# Patient Record
Sex: MALE | Race: WHITE
[De-identification: names, ages, dates, MRNs, and addresses within clinical notes are randomized per-mention and may not be internally consistent; named-entity substitution may affect disease eponyms.]

---

## 2017-02-23 ENCOUNTER — HOSPITAL ENCOUNTER (OUTPATIENT)
Age: 62
Discharge: HOME | End: 2017-02-23
Payer: COMMERCIAL

## 2017-02-23 DIAGNOSIS — N41.9: ICD-10-CM

## 2017-02-23 DIAGNOSIS — N40.1: Primary | ICD-10-CM

## 2017-02-23 DIAGNOSIS — R35.1: ICD-10-CM

## 2017-02-23 DIAGNOSIS — R35.0: ICD-10-CM

## 2017-02-23 DIAGNOSIS — R33.9: ICD-10-CM

## 2017-09-12 ENCOUNTER — HOSPITAL ENCOUNTER (OUTPATIENT)
Dept: HOSPITAL 76 - LAB.WCP | Age: 62
Discharge: HOME | End: 2017-09-12
Attending: PHYSICIAN ASSISTANT
Payer: COMMERCIAL

## 2017-09-12 DIAGNOSIS — R31.0: Primary | ICD-10-CM

## 2017-09-12 LAB
ALBUMIN/GLOB SERPL: 1.4 {RATIO} (ref 1–2.2)
ANION GAP SERPL CALCULATED.4IONS-SCNC: 7 MMOL/L (ref 6–13)
BASOPHILS NFR BLD AUTO: 0 10^3/UL (ref 0–0.1)
BASOPHILS NFR BLD AUTO: 0.4 %
BILIRUB BLD-MCNC: 0.7 MG/DL (ref 0.2–1)
BUN SERPL-MCNC: 18 MG/DL (ref 6–20)
CALCIUM UR-MCNC: 9.4 MG/DL (ref 8.5–10.3)
CHLORIDE SERPL-SCNC: 105 MMOL/L (ref 101–111)
CO2 SERPL-SCNC: 26 MMOL/L (ref 21–32)
CREAT SERPLBLD-SCNC: 0.9 MG/DL (ref 0.6–1.2)
EOSINOPHIL # BLD AUTO: 0.2 10^3/UL (ref 0–0.7)
EOSINOPHIL NFR BLD AUTO: 3.1 %
ERYTHROCYTE [DISTWIDTH] IN BLOOD BY AUTOMATED COUNT: 13.4 % (ref 12–15)
GFRSERPLBLD MDRD-ARVRAT: 86 ML/MIN/{1.73_M2} (ref 89–?)
GLOBULIN SER-MCNC: 3.3 G/DL (ref 2.1–4.2)
GLUCOSE SERPL-MCNC: 92 MG/DL (ref 70–100)
HCT VFR BLD AUTO: 41.2 % (ref 42–52)
HGB UR QL STRIP: 14.1 G/DL (ref 14–18)
LYMPHOCYTES # SPEC AUTO: 2 10^3/UL (ref 1.5–3.5)
LYMPHOCYTES NFR BLD AUTO: 26.1 %
MCH RBC QN AUTO: 29.2 PG (ref 27–31)
MCHC RBC AUTO-ENTMCNC: 34.2 G/DL (ref 32–36)
MCV RBC AUTO: 85.3 FL (ref 80–94)
MONOCYTES # BLD AUTO: 0.5 10^3/UL (ref 0–1)
MONOCYTES NFR BLD AUTO: 6.4 %
NEUTROPHILS # BLD AUTO: 4.9 10^3/UL (ref 1.5–6.6)
NEUTROPHILS # SNV AUTO: 7.7 X10^3/UL (ref 4.8–10.8)
NEUTROPHILS NFR BLD AUTO: 64 %
NRBC # BLD AUTO: 0.1 /100WBC
PDW BLD AUTO: 9.6 FL (ref 7.4–11.4)
POTASSIUM SERPL-SCNC: 3.8 MMOL/L (ref 3.5–5)
PROT SPEC-MCNC: 7.8 G/DL (ref 6.7–8.2)
PSA FREE SERPL-MCNC: 0.73 NG/ML (ref 0.16–2.81)
PSA SERPL-MCNC: 7.63 NG/ML (ref 0–2)
RBC MAR: 4.83 10^6/UL (ref 4.7–6.1)
SODIUM SERPLBLD-SCNC: 138 MMOL/L (ref 135–145)
WBC # BLD: 7.7 X10^3/UL

## 2017-09-12 PROCEDURE — 85025 COMPLETE CBC W/AUTO DIFF WBC: CPT

## 2017-09-12 PROCEDURE — 36415 COLL VENOUS BLD VENIPUNCTURE: CPT

## 2017-09-12 PROCEDURE — 80053 COMPREHEN METABOLIC PANEL: CPT

## 2017-09-12 PROCEDURE — 84154 ASSAY OF PSA FREE: CPT

## 2017-09-12 PROCEDURE — 87086 URINE CULTURE/COLONY COUNT: CPT

## 2017-09-18 ENCOUNTER — HOSPITAL ENCOUNTER (OUTPATIENT)
Dept: HOSPITAL 76 - DI | Age: 62
Discharge: HOME | End: 2017-09-18
Attending: PHYSICIAN ASSISTANT
Payer: COMMERCIAL

## 2017-09-18 DIAGNOSIS — R93.41: ICD-10-CM

## 2017-09-18 DIAGNOSIS — R31.0: Primary | ICD-10-CM

## 2017-09-18 PROCEDURE — 74178 CT ABD&PLV WO CNTR FLWD CNTR: CPT

## 2017-09-18 NOTE — CT REPORT
CT IVP: 09/18/2017

 

CLINICAL INDICATION: Gross hematuria. 

 

TECHNIQUE: Axial CT images of the abdomen and pelvis were obtained prior to and following 100 mL of I
sovue-300 intravenously, using split bolus technique. No previous CT is available for comparison. 

 

FINDINGS:  Limited evaluation of the lung bases is unremarkable. 

 

ABDOMEN: On the unenhanced images, there is no evidence of nephrolithiasis or hydronephrosis. The kid
neys demonstrate symmetric uptake and excretion of contrast. Left peripelvic cysts are present. No so
lid parenchymal lesion or collecting system lesion is identified. The liver, spleen, pancreas and adr
enal glands appear unremarkable. A small splenule is noted in the splenic hilum. No bowel dilatation,
 free gas, or free fluid is present. No abdominal adenopathy is seen. 

 

PELVIS: The appendix is seen in the right lower quadrant, and is normal in caliber. Sigmoid diverticu
losis is present, without CT evidence of diverticulitis. There is a possible papillary projection tez
sing from the bladder wall at the dome of the prostate. Correlation with cystoscopy is recommended. T
he distal ureters appear unremarkable. 

 

The osseous structures demonstrate mild degenerative changes. 

 

IMPRESSION:  POSSIBLE PAPILLARY PROJECTION IN THE LUMEN OF THE BLADDER VERSUS ASYMMETRIC ENLARGEMENT 
OF THE PROSTATE PROJECTING INTO THE BLADDER. CORRELATION WITH CYSTOSCOPY IS RECOMMENDED. NO EVIDENCE 
OF UPPER TRACT LESION. 

 

In accordance with CT protocol optimization, one or more of the following dose reduction techniques w
ere utilized for this exam:  automated exposure control, adjustment of mA and/or KV based on patient 
size, or use of iterative reconstructive technique. 

 

 

 

DD:09/18/2017 11:28:01  DT: 09/18/2017 11:42  JOB #: [id number]  EXT JOB #:[id number]

## 2017-12-06 ENCOUNTER — HOSPITAL ENCOUNTER (EMERGENCY)
Dept: HOSPITAL 76 - ED | Age: 62
Discharge: LEFT BEFORE DISCHARGE | End: 2017-12-06
Payer: COMMERCIAL

## 2017-12-06 VITALS — DIASTOLIC BLOOD PRESSURE: 79 MMHG | SYSTOLIC BLOOD PRESSURE: 140 MMHG

## 2017-12-06 DIAGNOSIS — Z53.21: Primary | ICD-10-CM

## 2020-03-08 ENCOUNTER — HOSPITAL ENCOUNTER (OUTPATIENT)
Dept: HOSPITAL 76 - EMS | Age: 65
Discharge: ANOTHER HOSPITAL | End: 2020-03-08
Attending: SURGERY
Payer: MEDICARE

## 2020-03-08 ENCOUNTER — HOSPITAL ENCOUNTER (EMERGENCY)
Dept: HOSPITAL 76 - ED | Age: 65
Discharge: ANOTHER HOSPITAL | End: 2020-03-08
Payer: MEDICARE

## 2020-03-08 VITALS — SYSTOLIC BLOOD PRESSURE: 168 MMHG | DIASTOLIC BLOOD PRESSURE: 105 MMHG

## 2020-03-08 DIAGNOSIS — I21.4: Primary | ICD-10-CM

## 2020-03-08 DIAGNOSIS — I10: ICD-10-CM

## 2020-03-08 DIAGNOSIS — R06.02: ICD-10-CM

## 2020-03-08 DIAGNOSIS — R07.9: Primary | ICD-10-CM

## 2020-03-08 DIAGNOSIS — E11.9: ICD-10-CM

## 2020-03-08 LAB
ALBUMIN DIAFP-MCNC: 4.9 G/DL (ref 3.2–5.5)
ALBUMIN/GLOB SERPL: 1.4 {RATIO} (ref 1–2.2)
ALP SERPL-CCNC: 76 IU/L (ref 42–121)
ALT SERPL W P-5'-P-CCNC: 42 IU/L (ref 10–60)
ANION GAP SERPL CALCULATED.4IONS-SCNC: 12 MMOL/L (ref 6–13)
AST SERPL W P-5'-P-CCNC: 30 IU/L (ref 10–42)
BASOPHILS NFR BLD AUTO: 0 10^3/UL (ref 0–0.1)
BASOPHILS NFR BLD AUTO: 0.4 %
BILIRUB BLD-MCNC: 0.9 MG/DL (ref 0.2–1)
BUN SERPL-MCNC: 25 MG/DL (ref 6–20)
CALCIUM UR-MCNC: 9.4 MG/DL (ref 8.5–10.3)
CHLORIDE SERPL-SCNC: 101 MMOL/L (ref 101–111)
CO2 SERPL-SCNC: 24 MMOL/L (ref 21–32)
CREAT SERPLBLD-SCNC: 1.1 MG/DL (ref 0.6–1.2)
EOSINOPHIL # BLD AUTO: 0.2 10^3/UL (ref 0–0.7)
EOSINOPHIL NFR BLD AUTO: 2.6 %
ERYTHROCYTE [DISTWIDTH] IN BLOOD BY AUTOMATED COUNT: 12.8 % (ref 12–15)
GFRSERPLBLD MDRD-ARVRAT: 67 ML/MIN/{1.73_M2} (ref 89–?)
GLOBULIN SER-MCNC: 3.6 G/DL (ref 2.1–4.2)
GLUCOSE SERPL-MCNC: 146 MG/DL (ref 70–100)
HGB UR QL STRIP: 14.7 G/DL (ref 14–18)
LIPASE SERPL-CCNC: 34 U/L (ref 22–51)
LYMPHOCYTES # SPEC AUTO: 2 10^3/UL (ref 1.5–3.5)
LYMPHOCYTES NFR BLD AUTO: 23.9 %
MCH RBC QN AUTO: 28.4 PG (ref 27–31)
MCHC RBC AUTO-ENTMCNC: 32.9 G/DL (ref 32–36)
MCV RBC AUTO: 86.3 FL (ref 80–94)
MONOCYTES # BLD AUTO: 0.5 10^3/UL (ref 0–1)
MONOCYTES NFR BLD AUTO: 6.1 %
NEUTROPHILS # BLD AUTO: 5.5 10^3/UL (ref 1.5–6.6)
NEUTROPHILS # SNV AUTO: 8.2 X10^3/UL (ref 4.8–10.8)
NEUTROPHILS NFR BLD AUTO: 66.6 %
PDW BLD AUTO: 10 FL (ref 7.4–11.4)
PLATELET # BLD: 200 10^3/UL (ref 130–450)
PROT SPEC-MCNC: 8.5 G/DL (ref 6.7–8.2)
RBC MAR: 5.18 10^6/UL (ref 4.7–6.1)
SODIUM SERPLBLD-SCNC: 137 MMOL/L (ref 135–145)

## 2020-03-08 PROCEDURE — 80053 COMPREHEN METABOLIC PANEL: CPT

## 2020-03-08 PROCEDURE — 84484 ASSAY OF TROPONIN QUANT: CPT

## 2020-03-08 PROCEDURE — 71045 X-RAY EXAM CHEST 1 VIEW: CPT

## 2020-03-08 PROCEDURE — 99285 EMERGENCY DEPT VISIT HI MDM: CPT

## 2020-03-08 PROCEDURE — 36415 COLL VENOUS BLD VENIPUNCTURE: CPT

## 2020-03-08 PROCEDURE — 93005 ELECTROCARDIOGRAM TRACING: CPT

## 2020-03-08 PROCEDURE — 83690 ASSAY OF LIPASE: CPT

## 2020-03-08 PROCEDURE — 85025 COMPLETE CBC W/AUTO DIFF WBC: CPT

## 2020-03-08 NOTE — XRAY REPORT
Reason:  Chest pain

Procedure Date:  03/08/2020   

Accession Number:  [id number] / [id number]                    

Procedure:  XR  - Chest 1 View X-Ray CPT Code:  86702

 

***Final Report***

 

 

FULL RESULT:

 

 

EXAM:

CHEST RADIOGRAPHY

 

EXAM DATE: 3/8/2020 05:42 AM.

 

CLINICAL HISTORY: Chest pain.

 

COMPARISON: CHEST 2 VIEW PA/LAT 04/13/2017 10:12 AM.

 

TECHNIQUE: 1 view.

 

FINDINGS:

Atherosclerotic plaque calcifications are seen in the aorta. The heart 

size is normal. Minimal left basilar atelectasis is seen. The right lung 

is clear. There is no pleural effusion or pneumothorax.

IMPRESSION: Minimal left basilar atelectasis.

 

[facility]IA

## 2020-03-08 NOTE — ED PHYSICIAN DOCUMENTATION
PD HPI CHEST PAIN





- Stated complaint


Stated Complaint: CP/SOA





- Chief complaint


Chief Complaint: Cardiac





- History obtained from


History obtained from: Patient





- History of Present Illness


Timing - onset: How many weeks ago (1)


Timing - onset during: Exertion


Timing - duration: Minutes


Timing - details: Abrupt onset, Intermittant


Pain level max: 5


Pain level now: 0


Quality: Pressure, Dull, Pain


Location: Substernal


Radiation: Left upper extremity, Right upper extremity


Improved by: Rest


Worsened by: Exertion


Associated symptoms: No: Shortness of air, Diaphoresis, Nausea, Vomiting, 

Feeling faint / dizzy, General Weakness, Palpitations, Cough


Similar symptoms before: Has not had sx before


Recently seen: Not recently seen





- Additional information


Additional information: 





c/o one week of exertional chest pain, midline, that radiates down back of both 

arms, mostly LUE. pain is only on exertion, lasts 15-20 minutes and is relieved 

with rest. He has noticed this pain is increasingly frequent with decreasing 

amounts of exertion, and tonight he was having chest pain simply when walking to

the bathroom from his bedroom. 





Review of Systems


Constitutional: reports: Reviewed and negative


Eyes: reports: Reviewed and negative


Ears: reports: Reviewed and negative


Nose: reports: Reviewed and negative


Throat: reports: Reviewed and negative


Cardiac: reports: Chest pain / pressure.  denies: Palpitations, Pedal edema, 

Calf pain


Respiratory: reports: Dyspnea (mild dyspnea during some episodes of chest pain, 

although he says he feels he is just breathing faster as a means of distracting 

himself from the pain).  denies: Cough


GI: reports: Reviewed and negative


: denies: Dysuria, Frequency


Skin: reports: Reviewed and negative


Musculoskeletal: reports: Reviewed and negative


Neurologic: reports: Reviewed and negative





PD PAST MEDICAL HISTORY





- Past Medical History


Cardiovascular: Hypertension


Endocrine/Autoimmune: Type 2 diabetes


GI: Hemorrhoids





- Past Surgical History


Past Surgical History: Yes





- Present Medications


Home Medications: 


                                Ambulatory Orders











 Medication  Instructions  Recorded  Confirmed


 


Lisinopril [Qbrelis]  03/08/20 


 


Tamsulosin [Flomax]  03/08/20 














- Allergies


Allergies/Adverse Reactions: 


                                    Allergies











Allergy/AdvReac Type Severity Reaction Status Date / Time


 


Sulfa (Sulfonamide Allergy  Unknown Verified 03/08/20 04:31





Antibiotics)     














- Social History


Does the pt smoke?: No


Smoking Status: Never smoker


Does the pt drink ETOH?: No


Does the pt have substance abuse?: No





- Immunizations


Immunizations are current?: Yes





- POLST


Patient has POLST: No





PD ED PE NORMAL





- Vitals


Vital signs reviewed: Yes





- General


General: Alert and oriented X 3, No acute distress, Well developed/nourished





- HEENT


HEENT: Moist mucous membranes





- Neck


Neck: Supple, no meningeal sign, No JVD





- Cardiac


Cardiac: RRR, No murmur, No gallop, No rub





- Respiratory


Respiratory: No respiratory distress, Clear bilaterally





- Abdomen


Abdomen: Soft, Non tender





- Derm


Derm: Normal color, Warm and dry





- Extremities


Extremities: No edema





- Neuro


Neuro: Alert and oriented X 3





Results





- Vitals


Vitals: 


                               Vital Signs - 24 hr











  03/08/20 03/08/20 03/08/20





  04:31 04:51 05:14


 


Temperature   


 


Heart Rate 81 78 75


 


Respiratory 20 18 18





Rate   


 


Blood Pressure 177/100 H 159/86 H 158/85 H


 


O2 Saturation 100 100 94














  03/08/20 03/08/20 03/08/20





  05:34 06:00 06:40


 


Temperature  36.3 C L 


 


Heart Rate 75  84


 


Respiratory 18  18





Rate   


 


Blood Pressure 155/83 H  163/88 H


 


O2 Saturation 94  93














  03/08/20 03/08/20 03/08/20





  07:30 08:30 10:00


 


Temperature   


 


Heart Rate 86 94 91


 


Respiratory 16 18 16





Rate   


 


Blood Pressure 173/93 H 171/102 H 168/105 H


 


O2 Saturation 97 93 95








                                     Oxygen











O2 Source                      Room air

















- EKG (time done)


  ** No standard instances


Rate: Rate (enter#) (77)


Rhythm: NSR


Axis: Normal


Intervals: Normal FL


QRS: Normal


Ischemia: ST depression (V3-V6, I, aVL)





- Labs


Labs: 


                                Laboratory Tests











  03/08/20 03/08/20 03/08/20





  04:54 04:54 04:54


 


WBC  8.2  


 


RBC  5.18  


 


Hgb  14.7  


 


Hct  44.7  


 


MCV  86.3  


 


MCH  28.4  


 


MCHC  32.9  


 


RDW  12.8  


 


Plt Count  200  


 


MPV  10.0  


 


Neut # (Auto)  5.5  


 


Lymph # (Auto)  2.0  


 


Mono # (Auto)  0.5  


 


Eos # (Auto)  0.2  


 


Baso # (Auto)  0.0  


 


Absolute Nucleated RBC  0.00  


 


Nucleated RBC %  0.0  


 


Sodium   137 


 


Potassium   3.7 


 


Chloride   101 


 


Carbon Dioxide   24 


 


Anion Gap   12.0 


 


BUN   25 H 


 


Creatinine   1.1 


 


Estimated GFR (MDRD)   67 L 


 


Glucose   146 H 


 


Calcium   9.4 


 


Total Bilirubin   0.9 


 


AST   30 


 


ALT   42 


 


Alkaline Phosphatase   76 


 


Troponin I High Sens    54.7 H*


 


Total Protein   8.5 H 


 


Albumin   4.9 


 


Globulin   3.6 


 


Albumin/Globulin Ratio   1.4 


 


Lipase   34 














  03/08/20





  07:13


 


WBC 


 


RBC 


 


Hgb 


 


Hct 


 


MCV 


 


MCH 


 


MCHC 


 


RDW 


 


Plt Count 


 


MPV 


 


Neut # (Auto) 


 


Lymph # (Auto) 


 


Mono # (Auto) 


 


Eos # (Auto) 


 


Baso # (Auto) 


 


Absolute Nucleated RBC 


 


Nucleated RBC % 


 


Sodium 


 


Potassium 


 


Chloride 


 


Carbon Dioxide 


 


Anion Gap 


 


BUN 


 


Creatinine 


 


Estimated GFR (MDRD) 


 


Glucose 


 


Calcium 


 


Total Bilirubin 


 


AST 


 


ALT 


 


Alkaline Phosphatase 


 


Troponin I High Sens  108.5 H*


 


Total Protein 


 


Albumin 


 


Globulin 


 


Albumin/Globulin Ratio 


 


Lipase 














- Rads (name of study)


  ** chest xray


Radiology: Prelim report reviewed, See rad report





PD MEDICAL DECISION MAKING





- ED course


Complexity details: reviewed results, re-evaluated patient, considered 

differential, d/w patient


ED course: 





HPI is s/o new-onset exertional angina that is steadily progressing in frequency

 with less provocation to symptom onset. EKG has mild (1mm) ST depressions in 

V3-V6, I, aVL. Initial high sensitivity troponin is 54.7. I recommended to him 

that I contact cardiology for consideration of transfer. He asked about other 

options and tells me he actually almost didn't come to the hospital and was 

going to wait to see his doctor in the outpatient setting. After further 

discussion, and considering that he is asymptomatic at rest and without ST 

elevations on EKG, we were able to agree on holding him for a 2-hour repeat 

troponin. This result was 108.5. He is agreeable to transfer, requests 

[facility].


D/W Dr. Singh, cardiologist at [facility], accepts patient for transfer.





Departure





- Departure


Disposition: 02 Transfer Acute Care Hosp


Clinical Impression: 


 NSTEMI (non-ST elevated myocardial infarction)





Chest pain


Qualifiers:


 Chest pain type: unspecified Qualified Code(s): R07.9 - Chest pain, unspecified





Condition: Stable

## 2021-04-24 ENCOUNTER — HOSPITAL ENCOUNTER (EMERGENCY)
Dept: HOSPITAL 76 - ED | Age: 66
Discharge: HOME | End: 2021-04-24
Payer: MEDICARE

## 2021-04-24 VITALS — DIASTOLIC BLOOD PRESSURE: 94 MMHG | SYSTOLIC BLOOD PRESSURE: 146 MMHG

## 2021-04-24 DIAGNOSIS — R05: ICD-10-CM

## 2021-04-24 DIAGNOSIS — R09.81: Primary | ICD-10-CM

## 2021-04-24 DIAGNOSIS — E11.9: ICD-10-CM

## 2021-04-24 DIAGNOSIS — Z95.5: ICD-10-CM

## 2021-04-24 DIAGNOSIS — E78.5: ICD-10-CM

## 2021-04-24 DIAGNOSIS — I25.10: ICD-10-CM

## 2021-04-24 DIAGNOSIS — Z20.822: ICD-10-CM

## 2021-04-24 DIAGNOSIS — I10: ICD-10-CM

## 2021-04-24 PROCEDURE — 99283 EMERGENCY DEPT VISIT LOW MDM: CPT

## 2021-04-24 PROCEDURE — 99282 EMERGENCY DEPT VISIT SF MDM: CPT

## 2021-04-24 NOTE — ED PHYSICIAN DOCUMENTATION
History of Present Illness





- Stated complaint


Stated Complaint: CONGESTION





- Chief complaint


Chief Complaint: Resp





- History obtained from


History obtained from: Patient





- Additonal information


Additional information: 





65-year-old man with past medical history of coronary artery disease status post

stents, hyperlipidemia presents with sore throat and congestion over the past 

couple days, requesting a Covid test.  Patient has a nonproductive cough but 

denies shortness of breath, chest pain, abdominal pain, nausea vomiting diarrhea

or fever chills.  He is planning on traveling and just wanted to get tested "to 

be safe".





Review of Systems


Ten Systems: 10 systems reviewed and negative


Constitutional: denies: Fever, Chills


Cardiac: denies: Chest pain / pressure


Respiratory: reports: Cough.  denies: Dyspnea





PD PAST MEDICAL HISTORY





- Past Medical History


Past Medical History: Yes


Cardiovascular: Hypertension


Endocrine/Autoimmune: Type 2 diabetes


GI: Hemorrhoids





- Past Surgical History


Past Surgical History: Yes


Cardiovascular: Coronary stent





- Present Medications


Home Medications: 


                                Ambulatory Orders











 Medication  Instructions  Recorded  Confirmed


 


Tamsulosin [Flomax] 0.4 mg PO DAILY 03/08/20 04/24/21


 


Atorvastatin [Lipitor] 40 mg PO DAILY 04/24/21 04/24/21














- Allergies


Allergies/Adverse Reactions: 


                                    Allergies











Allergy/AdvReac Type Severity Reaction Status Date / Time


 


Sulfa (Sulfonamide Allergy  Unknown Verified 03/08/20 04:31





Antibiotics)     














- Social History


Does the pt smoke?: No


Smoking Status: Never smoker


Does the pt drink ETOH?: No


Does the pt have substance abuse?: No





- Immunizations


Immunizations are current?: Yes





- POLST


Patient has POLST: No





PD ED PE NORMAL





- Vitals


Vital signs reviewed: Yes





- General


General: Alert and oriented X 3, No acute distress, Well developed/nourished





- HEENT


HEENT: Atraumatic, PERRL, EOMI, Moist mucous membranes, Other (mild nasal 

congestion)





- Neck


Neck: Supple, no meningeal sign





- Cardiac


Cardiac: RRR





- Respiratory


Respiratory: No respiratory distress, Clear bilaterally





- Abdomen


Abdomen: Non tender, Non distended





- Derm


Derm: Warm and dry





- Extremities


Extremities: No deformity





- Neuro


Neuro: Alert and oriented X 3





Results





- Vitals


Vitals: 


                               Vital Signs - 24 hr











  04/24/21 04/24/21 04/24/21





  10:47 11:38 12:38


 


Temperature 36.8 C  36.9 C


 


Heart Rate 72 66 67


 


Respiratory 16 18 12





Rate   


 


Blood Pressure 136/65 H 153/79 H 152/98 H


 


O2 Saturation 100 100 99














  04/24/21





  13:40


 


Temperature 36.7 C


 


Heart Rate 67


 


Respiratory 12





Rate 


 


Blood Pressure 146/94 H


 


O2 Saturation 100








                                     Oxygen











O2 Source                      Room air

















PD MEDICAL DECISION MAKING





- ED course


ED course: 





65-year-old man presents requesting a Covid test.  We are able to swab him and 

he will follow up with his results.  Patient advised to quarantine until that 

time.  Return precautions given.  He will follow up with his primary doctor





Departure





- Departure


Disposition: 01 Home, Self Care


Clinical Impression: 


 Sinus congestion, Cough





Condition: Good


Instructions:  ED Viral Syndrome


Comments: 


You are seen in the emergency department for a Covid test.  Please quarantine at

 home until your results come back.  Return to the emergency department if you 

experience any new or worsening symptoms including shortness of breath or have 

other concerns.  Follow-up with your primary doctor.


Discharge Date/Time: 04/24/21 13:45